# Patient Record
Sex: MALE | Race: WHITE | NOT HISPANIC OR LATINO | URBAN - METROPOLITAN AREA
[De-identification: names, ages, dates, MRNs, and addresses within clinical notes are randomized per-mention and may not be internally consistent; named-entity substitution may affect disease eponyms.]

---

## 2019-02-06 ENCOUNTER — OFFICE (OUTPATIENT)
Dept: URBAN - METROPOLITAN AREA CLINIC 78 | Facility: CLINIC | Age: 74
End: 2019-02-06

## 2019-02-06 VITALS
HEIGHT: 69 IN | DIASTOLIC BLOOD PRESSURE: 77 MMHG | HEART RATE: 56 BPM | WEIGHT: 256 LBS | SYSTOLIC BLOOD PRESSURE: 141 MMHG | TEMPERATURE: 97.5 F

## 2019-02-06 DIAGNOSIS — I51.9 HEART DISEASE, UNSPECIFIED: ICD-10-CM

## 2019-02-06 DIAGNOSIS — D62 ACUTE POSTHEMORRHAGIC ANEMIA: ICD-10-CM

## 2019-02-06 DIAGNOSIS — E11.9 TYPE 2 DIABETES MELLITUS WITHOUT COMPLICATIONS: ICD-10-CM

## 2019-02-06 DIAGNOSIS — Z86.010 PERSONAL HISTORY OF COLONIC POLYPS: ICD-10-CM

## 2019-02-06 PROCEDURE — 99214 OFFICE O/P EST MOD 30 MIN: CPT

## 2019-02-06 RX ORDER — PANTOPRAZOLE SODIUM 40 MG/1
TABLET, DELAYED RELEASE ORAL
Qty: 30 | Refills: 5 | Status: COMPLETED
Start: 2019-02-06 | End: 2020-08-18

## 2019-02-06 RX ORDER — PANTOPRAZOLE SODIUM 20 MG/1
TABLET, DELAYED RELEASE ORAL
Qty: 30 | Refills: 5 | Status: COMPLETED
Start: 2019-02-06 | End: 2020-08-18

## 2019-02-06 NOTE — SERVICEHPINOTES
74 yo male presents with recent history of black stools. He was in the hospital from 1/17-1/23, had gotten SOB after a course of clindamycin for cellulitis and was admitted for CHF exacerbation and new-onset a fib (which he states has currently resolved after recent cardioversion). He notes that he started having some black stools prior to leaving the hospital but he didn't mention it. He was started on Eliquis. Patient states the black stools resolved by approximately January 30th. He had labs yesterday showing Hgb of 9.7, down from 12.3 on 1/23. Dr. Srinivasan stopped his Eliquis as of yesterday. He has f/u labs planned again for later this week. Patient denies h/o PUD. He reports normal appetite and currently has normal stools. No abdominal pain. Last colonoscopy was in 2013 with adenomatous polyp.

## 2019-02-13 ENCOUNTER — ON CAMPUS - OUTPATIENT (OUTPATIENT)
Dept: URBAN - METROPOLITAN AREA HOSPITAL 63 | Facility: HOSPITAL | Age: 74
End: 2019-02-13

## 2019-02-13 DIAGNOSIS — D62 ACUTE POSTHEMORRHAGIC ANEMIA: ICD-10-CM

## 2019-02-13 DIAGNOSIS — K29.60 OTHER GASTRITIS WITHOUT BLEEDING: ICD-10-CM

## 2019-02-13 DIAGNOSIS — K92.1 MELENA: ICD-10-CM

## 2019-02-13 PROCEDURE — 43239 EGD BIOPSY SINGLE/MULTIPLE: CPT

## 2020-08-18 ENCOUNTER — OFFICE (OUTPATIENT)
Dept: URBAN - METROPOLITAN AREA CLINIC 79 | Facility: CLINIC | Age: 75
End: 2020-08-18
Payer: COMMERCIAL

## 2020-08-18 VITALS
SYSTOLIC BLOOD PRESSURE: 170 MMHG | WEIGHT: 272 LBS | HEART RATE: 68 BPM | DIASTOLIC BLOOD PRESSURE: 88 MMHG | HEIGHT: 69 IN | TEMPERATURE: 97.3 F

## 2020-08-18 DIAGNOSIS — E11.9 TYPE 2 DIABETES MELLITUS WITHOUT COMPLICATIONS: ICD-10-CM

## 2020-08-18 DIAGNOSIS — Z86.010 PERSONAL HISTORY OF COLONIC POLYPS: ICD-10-CM

## 2020-08-18 DIAGNOSIS — K22.70 BARRETT'S ESOPHAGUS WITHOUT DYSPLASIA: ICD-10-CM

## 2020-08-18 DIAGNOSIS — I51.9 HEART DISEASE, UNSPECIFIED: ICD-10-CM

## 2020-08-18 DIAGNOSIS — R19.5 OTHER FECAL ABNORMALITIES: ICD-10-CM

## 2020-08-18 PROCEDURE — 99214 OFFICE O/P EST MOD 30 MIN: CPT | Performed by: PHYSICIAN ASSISTANT

## 2020-08-18 NOTE — SERVICEHPINOTES
76 yo male presents with positive fecal occult blood test. Test was ordered by PCP. His last colonoscopy was in 2013 with an adenomatous polyp. He reports normal bowel habits. Has h/o some hemorrhoidal bleeding on occasion but denies seeing blood in stools. Feels well overall and has no digestive complaints. EGD last year (done due to h/o black stools and anemia) showed Davidson's esophagus. He has not been on PPI - states he has no GERD sx. He has h/o CHF and h/o aortic valve replacement circa April 2019. Reports a recent echo and says it was ok.Has PAD and had stents placed in 2019.He is on Eliquis and cilostazol.

## 2020-09-10 ENCOUNTER — ON CAMPUS - OUTPATIENT (OUTPATIENT)
Dept: URBAN - METROPOLITAN AREA HOSPITAL 16 | Facility: HOSPITAL | Age: 75
End: 2020-09-10
Payer: COMMERCIAL

## 2020-09-10 DIAGNOSIS — K31.89 OTHER DISEASES OF STOMACH AND DUODENUM: ICD-10-CM

## 2020-09-10 DIAGNOSIS — Z86.010 PERSONAL HISTORY OF COLONIC POLYPS: ICD-10-CM

## 2020-09-10 DIAGNOSIS — R19.5 OTHER FECAL ABNORMALITIES: ICD-10-CM

## 2020-09-10 DIAGNOSIS — K29.60 OTHER GASTRITIS WITHOUT BLEEDING: ICD-10-CM

## 2020-09-10 DIAGNOSIS — D12.3 BENIGN NEOPLASM OF TRANSVERSE COLON: ICD-10-CM

## 2020-09-10 DIAGNOSIS — I85.10 SECONDARY ESOPHAGEAL VARICES WITHOUT BLEEDING: ICD-10-CM

## 2020-09-10 PROCEDURE — 43239 EGD BIOPSY SINGLE/MULTIPLE: CPT | Performed by: INTERNAL MEDICINE

## 2020-09-10 PROCEDURE — 45380 COLONOSCOPY AND BIOPSY: CPT | Performed by: INTERNAL MEDICINE

## 2022-05-09 ENCOUNTER — NEW REFERRAL (OUTPATIENT)
Dept: URBAN - METROPOLITAN AREA CLINIC 66 | Facility: CLINIC | Age: 77
End: 2022-05-09

## 2022-05-09 DIAGNOSIS — H34.12: ICD-10-CM

## 2022-05-09 DIAGNOSIS — H35.372: ICD-10-CM

## 2022-05-09 DIAGNOSIS — H33.002: ICD-10-CM

## 2022-05-09 DIAGNOSIS — E11.9: ICD-10-CM

## 2022-05-09 DIAGNOSIS — H35.431: ICD-10-CM

## 2022-05-09 PROCEDURE — 92201 OPSCPY EXTND RTA DRAW UNI/BI: CPT

## 2022-05-09 PROCEDURE — 99205 OFFICE O/P NEW HI 60 MIN: CPT

## 2022-05-09 PROCEDURE — 92235 FLUORESCEIN ANGRPH MLTIFRAME: CPT

## 2022-05-09 PROCEDURE — 92134 CPTRZ OPH DX IMG PST SGM RTA: CPT

## 2022-05-09 ASSESSMENT — VISUAL ACUITY
OD_SC: 20/40-2
OS_SC: 20/100

## 2022-05-09 ASSESSMENT — TONOMETRY
OS_IOP_MMHG: 21
OD_IOP_MMHG: 20

## 2022-05-16 ENCOUNTER — FOLLOW UP (OUTPATIENT)
Dept: URBAN - METROPOLITAN AREA CLINIC 66 | Facility: CLINIC | Age: 77
End: 2022-05-16

## 2022-05-16 DIAGNOSIS — H33.002: ICD-10-CM

## 2022-05-16 DIAGNOSIS — E11.9: ICD-10-CM

## 2022-05-16 DIAGNOSIS — H35.372: ICD-10-CM

## 2022-05-16 DIAGNOSIS — H34.12: ICD-10-CM

## 2022-05-16 PROCEDURE — 92134 CPTRZ OPH DX IMG PST SGM RTA: CPT

## 2022-05-16 PROCEDURE — 92012 INTRM OPH EXAM EST PATIENT: CPT

## 2022-05-16 ASSESSMENT — TONOMETRY
OS_IOP_MMHG: 21
OS_IOP_MMHG: 21

## 2022-05-16 ASSESSMENT — VISUAL ACUITY
OD_CC: 20/50+2
OD_PH: 20/40

## 2022-06-16 ENCOUNTER — FOLLOW UP (OUTPATIENT)
Dept: URBAN - METROPOLITAN AREA CLINIC 66 | Facility: CLINIC | Age: 77
End: 2022-06-16

## 2022-06-16 DIAGNOSIS — H34.12: ICD-10-CM

## 2022-06-16 DIAGNOSIS — H40.89: ICD-10-CM

## 2022-06-16 DIAGNOSIS — H35.372: ICD-10-CM

## 2022-06-16 DIAGNOSIS — E11.9: ICD-10-CM

## 2022-06-16 DIAGNOSIS — H33.002: ICD-10-CM

## 2022-06-16 PROCEDURE — 92014 COMPRE OPH EXAM EST PT 1/>: CPT | Mod: 25

## 2022-06-16 PROCEDURE — 92134 CPTRZ OPH DX IMG PST SGM RTA: CPT

## 2022-06-16 PROCEDURE — 67028 INJECTION EYE DRUG: CPT

## 2022-06-16 ASSESSMENT — VISUAL ACUITY
OS_SC: CF 3FT
OD_PH: 20/60+2
OD_SC: 20/70-2

## 2022-06-16 ASSESSMENT — TONOMETRY
OD_IOP_MMHG: 16
OS_IOP_MMHG: 42
OS_IOP_MMHG: 26

## 2022-06-30 ENCOUNTER — IOP CHECK (OUTPATIENT)
Dept: URBAN - METROPOLITAN AREA CLINIC 66 | Facility: CLINIC | Age: 77
End: 2022-06-30

## 2022-06-30 DIAGNOSIS — E11.9: ICD-10-CM

## 2022-06-30 DIAGNOSIS — H33.002: ICD-10-CM

## 2022-06-30 DIAGNOSIS — H40.89: ICD-10-CM

## 2022-06-30 DIAGNOSIS — H34.12: ICD-10-CM

## 2022-06-30 DIAGNOSIS — H35.372: ICD-10-CM

## 2022-06-30 PROCEDURE — 99212 OFFICE O/P EST SF 10 MIN: CPT | Mod: NC

## 2022-06-30 ASSESSMENT — TONOMETRY
OS_IOP_MMHG: 26
OS_IOP_MMHG: 44
OS_IOP_MMHG: 60

## 2022-06-30 ASSESSMENT — VISUAL ACUITY: OS_CC: CF 1FT

## (undated) RX ORDER — BRIMONIDINE TARTRATE, TIMOLOL MALEATE 2; 5 MG/ML; MG/ML
1 SOLUTION/ DROPS OPHTHALMIC TWICE A DAY
Start: 2022-05-09